# Patient Record
Sex: MALE | Race: WHITE | NOT HISPANIC OR LATINO | Employment: OTHER | URBAN - METROPOLITAN AREA
[De-identification: names, ages, dates, MRNs, and addresses within clinical notes are randomized per-mention and may not be internally consistent; named-entity substitution may affect disease eponyms.]

---

## 2023-06-04 ENCOUNTER — OFFICE VISIT (OUTPATIENT)
Dept: URGENT CARE | Age: 58
End: 2023-06-04
Payer: COMMERCIAL

## 2023-06-04 VITALS
TEMPERATURE: 98.4 F | HEART RATE: 76 BPM | WEIGHT: 215 LBS | RESPIRATION RATE: 18 BRPM | HEIGHT: 68 IN | BODY MASS INDEX: 32.58 KG/M2 | OXYGEN SATURATION: 100 % | DIASTOLIC BLOOD PRESSURE: 72 MMHG | SYSTOLIC BLOOD PRESSURE: 132 MMHG

## 2023-06-04 DIAGNOSIS — T14.8XXA SPLINTER: Primary | ICD-10-CM

## 2023-06-04 PROCEDURE — 99213 OFFICE O/P EST LOW 20 MIN: CPT

## 2023-06-04 PROCEDURE — 26080 EXPLORE/TREAT FINGER JOINT: CPT

## 2023-06-04 RX ORDER — CEPHALEXIN 500 MG/1
500 CAPSULE ORAL EVERY 12 HOURS SCHEDULED
Qty: 14 CAPSULE | Refills: 0 | Status: SHIPPED | OUTPATIENT
Start: 2023-06-04 | End: 2023-06-11

## 2023-06-04 NOTE — PROGRESS NOTES
[facility]        NAME: Dashawn Wray is a 62 y o  male  : 1965    MRN: [id number]  DATE: 2023  TIME: 12:56 PM    Assessment and Plan   Splinter Yue Ocampo  8XXA]  1  Splinter  cephalexin (KEFLEX) 500 mg capsule        Patient presents for eval of splinter to left palm  Rbeed hand on wooden railing about an hour ago  No current bleeding  Splinter visualized- removed successfully  Patient Instructions       Follow up with PCP as needed    Chief Complaint     Chief Complaint   Patient presents with   • Hand Injury     Put hand on wood railing and got splinter in left palm of hand earlier today         History of Present Illness       Patient presents for eval of splinter to left palm  Rbeed hand on wooden railing about an hour ago  No current bleeding  Splinter visualized- removed successfully  Review of Systems   Review of Systems   Constitutional: Negative for fever  Skin: Positive for wound  Current Medications       Current Outpatient Medications:   •  cephalexin (KEFLEX) 500 mg capsule, Take 1 capsule (500 mg total) by mouth every 12 (twelve) hours for 7 days, Disp: 14 capsule, Rfl: 0    Current Allergies     Allergies as of 2023   • (Not on File)            The following portions of the patient's history were reviewed and updated as appropriate: allergies, current medications, past family history, past medical history, past social history, past surgical history and problem list      Past Medical History:   Diagnosis Date   • Diabetes mellitus ([address] )    • High cholesterol    • Hypertension        Past Surgical History:   Procedure Laterality Date   • BACK SURGERY     • KNEE ARTHROCENTESIS     • SHOULDER SURGERY Left    • SPINE SURGERY         History reviewed  No pertinent family history  Medications have been verified          Objective   /72 (BP Location: Right arm, Patient Position: Sitting)   Pulse 76   Temp 98 4 °F (36 9 °C) (Tympanic)   Resp 18 " Ht 5' 8\" (1 727 m)   Wt 97 5 kg (215 lb)   SpO2 100%   BMI 32 69 kg/m²   No LMP for male patient  Physical Exam     Physical Exam  Vitals reviewed  Constitutional:       Appearance: Normal appearance  Skin:     General: Skin is warm and dry  Findings: Wound present  Neurological:      General: No focal deficit present  Mental Status: He is alert and oriented to person, place, and time  Mental status is at baseline  Universal Protocol:  Consent: Verbal consent obtained  Consent given by: patient  Patient understanding: patient states understanding of the procedure being performed  Patient identity confirmed: verbally with patient    Foreign body removal    Date/Time: 6/4/2023 12:00 PM    Performed by: ESA Husain  Authorized by: ESA Husain  Body area: skin  General location: upper extremity  Location details: left hand  Localization method: visualized  Removal mechanism: forceps  Dressing: dressing applied  Tendon involvement: none  Depth: subcutaneous  Complexity: simple  1 objects recovered    Objects recovered: splinter  Post-procedure assessment: foreign body removed  Patient tolerance: patient tolerated the procedure well with no immediate complications            "

## 2024-08-28 ENCOUNTER — OFFICE VISIT (OUTPATIENT)
Dept: URGENT CARE | Age: 59
End: 2024-08-28
Payer: COMMERCIAL

## 2024-08-28 VITALS
HEART RATE: 59 BPM | BODY MASS INDEX: 34.06 KG/M2 | DIASTOLIC BLOOD PRESSURE: 74 MMHG | TEMPERATURE: 97.2 F | RESPIRATION RATE: 16 BRPM | WEIGHT: 217 LBS | HEIGHT: 67 IN | SYSTOLIC BLOOD PRESSURE: 131 MMHG | OXYGEN SATURATION: 98 %

## 2024-08-28 DIAGNOSIS — T14.8XXA SPLINTER: Primary | ICD-10-CM

## 2024-08-28 PROCEDURE — 28190 REMOVAL OF FOOT FOREIGN BODY: CPT | Performed by: EMERGENCY MEDICINE

## 2024-08-28 PROCEDURE — 10120 INC&RMVL FB SUBQ TISS SMPL: CPT

## 2024-08-28 PROCEDURE — 99213 OFFICE O/P EST LOW 20 MIN: CPT | Performed by: EMERGENCY MEDICINE

## 2024-08-28 NOTE — PROGRESS NOTES
[facility]        NAME: Ryan Hutchins is a 59 y.o. male  : 1965    MRN: [id number]  DATE: 2024  TIME: 2:44 PM    Assessment and Plan   Splinter [T14.8XXA]  1. Splinter          Stepped on a piece of wood on his deck yesterday- has splinter. Attempted to removed. Removed successfully with splinter forceps. TDAP UTD. No s/s infection    Patient Instructions       Follow up with PCP as needed    If tests have been performed at [facility], our office will contact you with results if changes need to be made to the care plan discussed with you at the visit.  [facility]    Chief Complaint     Chief Complaint   Patient presents with    Foreign Body in Skin     Happened last night. Walking on wood deck dn got a splinter. Pt said its very tiny but in deep. Left foot.          History of Present Illness       Stepped on a piece of wood on his deck yesterday- has splinter. Attempted to removed. Removed successfully with splinter forceps. TDAP UTD. No s/s infection    Foreign Body in Skin        Review of Systems   Review of Systems   Skin:         Splinter in left foot   All other systems reviewed and are negative.        Current Medications     No current outpatient medications on file.    Current Allergies     Allergies as of 2024 - never reviewed   Allergen Reaction Noted    Lisinopril Cough 2002            The following portions of the patient's history were reviewed and updated as appropriate: allergies, current medications, past family history, past medical history, past social history, past surgical history and problem list.     Past Medical History:   Diagnosis Date    Diabetes mellitus (HCC)     High cholesterol     Hypertension        Past Surgical History:   Procedure Laterality Date    BACK SURGERY      KNEE ARTHROCENTESIS      SHOULDER SURGERY Left     SPINE SURGERY         History reviewed. No pertinent family  "history.      Medications have been verified.        Objective   /74   Pulse 59   Temp (!) 97.2 °F (36.2 °C)   Resp 16   Ht 5' 7\" (1.702 m)   Wt 98.4 kg (217 lb)   SpO2 98%   BMI 33.99 kg/m²   No LMP for male patient.       Physical Exam     Physical Exam  Vitals reviewed.   Constitutional:       Appearance: Normal appearance.   Musculoskeletal:        Feet:    Feet:      Comments: splinter  Neurological:      Mental Status: He is alert.       Universal Protocol:  Risks and benefits: risks, benefits and alternatives were discussed  Consent given by: patient  Patient identity confirmed: verbally with patient  Foreign body removal    Date/Time: 8/28/2024 12:30 PM    Performed by: ESA Carmichael  Authorized by: ESA Carmichael  Body area: skin  General location: lower extremity  Location details: left foot  1 objects recovered.  Objects recovered: splinter  Post-procedure assessment: foreign body removed  Patient tolerance: patient tolerated the procedure well with no immediate complications                  "

## 2024-10-24 ENCOUNTER — CONSULT (OUTPATIENT)
Dept: GASTROENTEROLOGY | Facility: MEDICAL CENTER | Age: 59
End: 2024-10-24
Payer: COMMERCIAL

## 2024-10-24 ENCOUNTER — TELEPHONE (OUTPATIENT)
Dept: GASTROENTEROLOGY | Facility: MEDICAL CENTER | Age: 59
End: 2024-10-24

## 2024-10-24 VITALS
HEART RATE: 66 BPM | OXYGEN SATURATION: 99 % | SYSTOLIC BLOOD PRESSURE: 129 MMHG | WEIGHT: 213.1 LBS | DIASTOLIC BLOOD PRESSURE: 75 MMHG | TEMPERATURE: 98.4 F | HEIGHT: 67 IN | BODY MASS INDEX: 33.45 KG/M2

## 2024-10-24 DIAGNOSIS — Z12.11 SCREENING FOR MALIGNANT NEOPLASM OF COLON: Primary | ICD-10-CM

## 2024-10-24 PROCEDURE — 99203 OFFICE O/P NEW LOW 30 MIN: CPT | Performed by: STUDENT IN AN ORGANIZED HEALTH CARE EDUCATION/TRAINING PROGRAM

## 2024-10-24 RX ORDER — LIDOCAINE 50 MG/G
OINTMENT TOPICAL
COMMUNITY
Start: 2024-02-28 | End: 2025-02-28

## 2024-10-24 RX ORDER — TAMSULOSIN HYDROCHLORIDE 0.4 MG/1
CAPSULE ORAL
COMMUNITY
Start: 2024-03-21 | End: 2025-02-28

## 2024-10-24 RX ORDER — METOPROLOL TARTRATE 25 MG/1
25 TABLET, FILM COATED ORAL 2 TIMES DAILY
COMMUNITY

## 2024-10-24 RX ORDER — BISACODYL 5 MG/1
20 TABLET, DELAYED RELEASE ORAL SEE ADMIN INSTRUCTIONS
Qty: 4 TABLET | Refills: 0 | Status: SHIPPED | OUTPATIENT
Start: 2024-10-24

## 2024-10-24 RX ORDER — ALLOPURINOL 300 MG/1
1 TABLET ORAL DAILY
COMMUNITY
Start: 2024-05-09 | End: 2025-02-28

## 2024-10-24 RX ORDER — DICLOFENAC POTASSIUM 50 MG/1
50 TABLET, FILM COATED ORAL 3 TIMES DAILY
COMMUNITY

## 2024-10-24 RX ORDER — ALOGLIPTIN 25 MG/1
TABLET, FILM COATED ORAL
COMMUNITY
Start: 2024-02-15 | End: 2025-02-15

## 2024-10-24 RX ORDER — ETODOLAC 300 MG/1
CAPSULE ORAL
COMMUNITY
Start: 2024-06-17 | End: 2025-02-01

## 2024-10-24 RX ORDER — ROSUVASTATIN CALCIUM 20 MG/1
TABLET, COATED ORAL
COMMUNITY
Start: 2024-02-01 | End: 2025-02-01

## 2024-10-24 RX ORDER — TRAMADOL HYDROCHLORIDE 50 MG/1
1 TABLET ORAL 2 TIMES DAILY PRN
COMMUNITY
Start: 2024-10-10

## 2024-10-24 RX ORDER — POLYETHYLENE GLYCOL 3350, SODIUM SULFATE ANHYDROUS, SODIUM BICARBONATE, SODIUM CHLORIDE, POTASSIUM CHLORIDE 236; 22.74; 6.74; 5.86; 2.97 G/4L; G/4L; G/4L; G/4L; G/4L
4000 POWDER, FOR SOLUTION ORAL ONCE
Qty: 4000 ML | Refills: 0 | Status: SHIPPED | OUTPATIENT
Start: 2024-10-24 | End: 2024-10-24

## 2024-10-24 RX ORDER — MELOXICAM 15 MG/1
TABLET ORAL
COMMUNITY
Start: 2024-03-07 | End: 2025-03-08

## 2024-10-24 NOTE — TELEPHONE ENCOUNTER
Procedure: Colonoscopy  Date: 12/04/2024  Physician performing: Dr. Fofana  Location of procedure:  [place]  Instructions given to patient: Golyt[place]   Diabetic: N/A  Clearances: N/A

## 2024-10-24 NOTE — PROGRESS NOTES
"Ambulatory Visit  Name: Ryan Hutchins      : 1965      MRN: [id number]  Encounter Provider: Vahe Fofana MD  Encounter Date: 10/24/2024   Encounter department: [facility] [place]    Assessment & Plan  Screening for malignant neoplasm of colon  He is at average risk for CRC. He is due for colonoscopy, and risk and benefits were discussed.    - Colonoscopy, Golytely    Orders:    Colonoscopy; Future    polyethylene glycol (Golytely) 4000 mL solution; Take 4,000 mL by mouth once for 1 dose Take 4000 mL by mouth once for 1 dose. Use as directed    bisacodyl (DULCOLAX) 5 mg EC tablet; Take 4 tablets (20 mg total) by mouth see administration instructions For colonoscopy prep.      History of Present Illness     Ryan Hutchins is a 59 y.o. male w/ hx of hypertension, diabetes, liver creating hyperlipidemia only thing who presents for screening colonoscopy.    Patient denies abdominal pain, diarrhea, constipation, hematochezia, melena, or unintentional weight loss. No family history of colon cancer.     He had a remote colonoscopy which was reportedly normal.      Review of Systems        Objective     /75   Pulse 66   Temp 98.4 °F (36.9 °C) (Tympanic)   Ht 5' 7\" (1.702 m)   Wt 96.7 kg (213 lb 1.6 oz)   SpO2 99%   BMI 33.38 kg/m²     Physical Exam    General: No acute distress  Abdomen: Soft, non-tender, non-distended, normoactive bowel sounds  Neuro: Awake, alert, oriented x 3    "

## 2024-11-19 ENCOUNTER — TELEPHONE (OUTPATIENT)
Dept: GASTROENTEROLOGY | Facility: MEDICAL CENTER | Age: 59
End: 2024-11-19

## 2024-11-19 NOTE — TELEPHONE ENCOUNTER
Spoke with patient; reviewed instructions--reviewed GI lab will call with arrival time and to call with any questions.

## 2024-11-19 NOTE — TELEPHONE ENCOUNTER
Confirming Upcoming Procedure: Colonoscopy on December 4  Physician performing: Dr. Fofana  Location of procedure:  [facility]  Prep: Golytely  Diabetic: No

## 2024-11-20 ENCOUNTER — ANESTHESIA EVENT (OUTPATIENT)
Dept: ANESTHESIOLOGY | Facility: HOSPITAL | Age: 59
End: 2024-11-20

## 2024-11-20 ENCOUNTER — ANESTHESIA (OUTPATIENT)
Dept: ANESTHESIOLOGY | Facility: HOSPITAL | Age: 59
End: 2024-11-20

## 2024-12-02 ENCOUNTER — TELEPHONE (OUTPATIENT)
Dept: GASTROENTEROLOGY | Facility: MEDICAL CENTER | Age: 59
End: 2024-12-02

## 2024-12-03 ENCOUNTER — TELEPHONE (OUTPATIENT)
Dept: GASTROENTEROLOGY | Facility: MEDICAL CENTER | Age: 59
End: 2024-12-03

## 2024-12-03 RX ORDER — SODIUM CHLORIDE 9 MG/ML
125 INJECTION, SOLUTION INTRAVENOUS CONTINUOUS
Status: CANCELLED | OUTPATIENT
Start: 2024-12-03

## 2024-12-03 NOTE — TELEPHONE ENCOUNTER
Pt reports he is a type 2 diabetic and his colonoscopy is scheduled for 1 PM tomorrow. Pt has not began bowel prep. Pt was offered to transfer to the scheduling department to reschedule however he declines at this time. Pt checks his blood sugar every couple of days. Advised to check his BG today, tonight, and tomorrow. If signs/symptoms of hypoglycemia occur, pt may dilute honey with water and drink. Pt does not have sweets or beverages that contain sugar at home. He prefers to not go out to purchase apple juice or hard candy. Pt uses sitagliptin (Januvia) daily. Advised to hold this medication prior to procedure; pt may take his daily dose after tomorrow's procedure. All questions answered. Pt is agreeable to recommendations.

## 2024-12-03 NOTE — TELEPHONE ENCOUNTER
Pt calling for time, advised 0100PM arrival time. Reminded pt about need for  18yrs+, pt confirmed understanding. Answered questions about prep.

## 2024-12-04 ENCOUNTER — ANESTHESIA EVENT (OUTPATIENT)
Dept: GASTROENTEROLOGY | Facility: MEDICAL CENTER | Age: 59
End: 2024-12-04
Payer: COMMERCIAL

## 2024-12-04 ENCOUNTER — HOSPITAL ENCOUNTER (OUTPATIENT)
Dept: GASTROENTEROLOGY | Facility: MEDICAL CENTER | Age: 59
Setting detail: OUTPATIENT SURGERY
Discharge: HOME | End: 2024-12-04
Payer: COMMERCIAL

## 2024-12-04 ENCOUNTER — ANESTHESIA (OUTPATIENT)
Dept: GASTROENTEROLOGY | Facility: MEDICAL CENTER | Age: 59
End: 2024-12-04
Payer: COMMERCIAL

## 2024-12-04 VITALS
OXYGEN SATURATION: 97 % | BODY MASS INDEX: 33.43 KG/M2 | HEART RATE: 72 BPM | TEMPERATURE: 97.2 F | WEIGHT: 213 LBS | HEIGHT: 67 IN | SYSTOLIC BLOOD PRESSURE: 134 MMHG | RESPIRATION RATE: 18 BRPM | DIASTOLIC BLOOD PRESSURE: 78 MMHG

## 2024-12-04 DIAGNOSIS — Z12.11 SCREENING FOR MALIGNANT NEOPLASM OF COLON: ICD-10-CM

## 2024-12-04 LAB — GLUCOSE SERPL-MCNC: 71 MG/DL (ref 65–140)

## 2024-12-04 PROCEDURE — 82948 REAGENT STRIP/BLOOD GLUCOSE: CPT

## 2024-12-04 PROCEDURE — 88342 IMHCHEM/IMCYTCHM 1ST ANTB: CPT | Performed by: STUDENT IN AN ORGANIZED HEALTH CARE EDUCATION/TRAINING PROGRAM

## 2024-12-04 PROCEDURE — 88305 TISSUE EXAM BY PATHOLOGIST: CPT | Performed by: STUDENT IN AN ORGANIZED HEALTH CARE EDUCATION/TRAINING PROGRAM

## 2024-12-04 PROCEDURE — 88341 IMHCHEM/IMCYTCHM EA ADD ANTB: CPT | Performed by: STUDENT IN AN ORGANIZED HEALTH CARE EDUCATION/TRAINING PROGRAM

## 2024-12-04 PROCEDURE — 45385 COLONOSCOPY W/LESION REMOVAL: CPT | Performed by: STUDENT IN AN ORGANIZED HEALTH CARE EDUCATION/TRAINING PROGRAM

## 2024-12-04 RX ORDER — LIDOCAINE HYDROCHLORIDE 20 MG/ML
INJECTION, SOLUTION EPIDURAL; INFILTRATION; INTRACAUDAL; PERINEURAL AS NEEDED
Status: DISCONTINUED | OUTPATIENT
Start: 2024-12-04 | End: 2024-12-04

## 2024-12-04 RX ORDER — SIMETHICONE 40MG/0.6ML
SUSPENSION, DROPS(FINAL DOSAGE FORM)(ML) ORAL AS NEEDED
Status: COMPLETED | OUTPATIENT
Start: 2024-12-04 | End: 2024-12-04

## 2024-12-04 RX ORDER — SODIUM CHLORIDE 9 MG/ML
125 INJECTION, SOLUTION INTRAVENOUS CONTINUOUS
Status: DISCONTINUED | OUTPATIENT
Start: 2024-12-04 | End: 2024-12-08 | Stop reason: HOSPADM

## 2024-12-04 RX ORDER — PROPOFOL 10 MG/ML
INJECTION, EMULSION INTRAVENOUS AS NEEDED
Status: DISCONTINUED | OUTPATIENT
Start: 2024-12-04 | End: 2024-12-04

## 2024-12-04 RX ADMIN — SODIUM CHLORIDE: 0.9 INJECTION, SOLUTION INTRAVENOUS at 14:20

## 2024-12-04 RX ADMIN — PROPOFOL 20 MG: 10 INJECTION, EMULSION INTRAVENOUS at 14:42

## 2024-12-04 RX ADMIN — PROPOFOL 20 MG: 10 INJECTION, EMULSION INTRAVENOUS at 14:48

## 2024-12-04 RX ADMIN — SODIUM CHLORIDE 125 ML/HR: 0.9 INJECTION, SOLUTION INTRAVENOUS at 13:23

## 2024-12-04 RX ADMIN — PROPOFOL 80 MG: 10 INJECTION, EMULSION INTRAVENOUS at 14:25

## 2024-12-04 RX ADMIN — LIDOCAINE HYDROCHLORIDE 100 MG: 20 INJECTION, SOLUTION EPIDURAL; INFILTRATION; INTRACAUDAL at 14:25

## 2024-12-04 RX ADMIN — PROPOFOL 20 MG: 10 INJECTION, EMULSION INTRAVENOUS at 14:45

## 2024-12-04 RX ADMIN — PROPOFOL 120 MCG/KG/MIN: 10 INJECTION, EMULSION INTRAVENOUS at 14:27

## 2024-12-04 RX ADMIN — Medication 40 MG: at 14:30

## 2024-12-04 NOTE — H&P
History and Physical - [facility]  Ryan Hutchins 59 y.o. male MRN: [id number]          HPI: Ryan Hutchins is a 59 y.o. year old male who presents for screening colonoscopy.      REVIEW OF SYSTEMS: Per the HPI, and otherwise unremarkable.    Historical Information   Past Medical History:   Diagnosis Date    Diabetes mellitus (HCC)     High cholesterol     Hypertension      Past Surgical History:   Procedure Laterality Date    BACK SURGERY      KNEE ARTHROCENTESIS      SHOULDER SURGERY Left     SPINE SURGERY       Social History   Social History     Substance and Sexual Activity   Alcohol Use Not Currently     Social History     Substance and Sexual Activity   Drug Use Not on file     Social History     Tobacco Use   Smoking Status Former    Types: Cigarettes    Passive exposure: Past   Smokeless Tobacco Never     No family history on file.    Meds/Allergies       Current Outpatient Medications:     allopurinol (ZYLOPRIM) 300 mg tablet    Alogliptin Benzoate 25 MG TABS    bisacodyl (DULCOLAX) 5 mg EC tablet    diclofenac potassium (CATAFLAM) 50 mg tablet    Ergocalciferol 50 MCG (2000 UT) TABS    etodolac (LODINE) 300 MG capsule    lidocaine (XYLOCAINE) 5 % ointment    meloxicam (MOBIC) 15 mg tablet    metFORMIN (GLUCOPHAGE) 1000 MG tablet    metoprolol tartrate (LOPRESSOR) 25 mg tablet    polyethylene glycol (Golytely) 4000 mL solution    rosuvastatin (CRESTOR) 20 MG tablet    tamsulosin (FLOMAX) 0.4 mg    traMADol (ULTRAM) 50 mg tablet    Allergies   Allergen Reactions    Lisinopril Cough       Objective     There were no vitals taken for this visit.      PHYSICAL EXAM    GEN: NAD  CARDIO: RRR  PULM: CTA bilaterally  ABD: soft, non-tender, non-distended  EXT: no lower extremity edema  NEURO: AAOx3      ASSESSMENT/PLAN:  59 y.o. year old male here for colonoscopy; he is stable and optimized for his procedure.

## 2024-12-04 NOTE — ANESTHESIA POSTPROCEDURE EVALUATION
Post-Op Assessment Note    CV Status:  Stable  Pain Score: 0    Pain management: adequate       Mental Status:  Alert and awake   Hydration Status:  Euvolemic   PONV Controlled:  Controlled   Airway Patency:  Patent     Post Op Vitals Reviewed: Yes    No anethesia notable event occurred.    Staff: CRNA           Last Filed PACU Vitals:  Vitals Value Taken Time   Temp     Pulse 74    /62    Resp 16    SpO2 98 ra        Modified Jefferson:  Activity: 2 (12/4/2024  2:59 PM)  Respiration: 2 (12/4/2024  2:59 PM)  Circulation: 2 (12/4/2024  2:59 PM)  Consciousness: 2 (12/4/2024  2:59 PM)  Oxygen Saturation: 2 (12/4/2024  2:59 PM)  Modified Jefferson Score: 10 (12/4/2024  2:59 PM)

## 2024-12-04 NOTE — ANESTHESIA PROCEDURE NOTES
Anesthesia Notable Event    Date/Time: 12/4/2024 2:33 PM    Patient location during procedure: pre-op  Performed by: Ileana Murphy CRNA  Authorized by: Bennie Johnson MD    Pt called r/t appointment time of 1pm and being diabetic. Told by call center staff that he may have honey in water, juice, or hard candy with no regard for NPO timing.   Cases delayed 30 minutes to meet 2 hr npo  Endo  aware- may reach out to educate RN.

## 2024-12-04 NOTE — ANESTHESIA PREPROCEDURE EVALUATION
Procedure:  COLONOSCOPY    Relevant Problems   ANESTHESIA (within normal limits)      CARDIO (within normal limits)      ENDO (within normal limits)      GI/HEPATIC (within normal limits)      /RENAL (within normal limits)      GYN (within normal limits)      HEMATOLOGY (within normal limits)      MUSCULOSKELETAL (within normal limits)      NEURO/PSYCH (within normal limits)      PULMONARY (within normal limits)        Physical Exam    Airway    Mallampati score: II         Dental       Cardiovascular  Cardiovascular exam normal    Pulmonary  Pulmonary exam normal     Other Findings        Anesthesia Plan  ASA Score- 2     Anesthesia Type- IV sedation with anesthesia with ASA Monitors.         Additional Monitors:     Airway Plan:     Comment: H/O HTN, high cholesterol, DM type II.       Plan Factors-Exercise tolerance (METS): >4 METS.    Chart reviewed.    Patient summary reviewed.    Patient is not a current smoker. Patient not instructed to abstain from smoking on day of procedure. Patient did not smoke on day of surgery.            Induction-     Postoperative Plan-         Informed Consent- Anesthetic plan and risks discussed with patient.  I personally reviewed this patient with the CRNA. Discussed and agreed on the Anesthesia Plan with the CRNA..

## 2024-12-10 PROCEDURE — 88341 IMHCHEM/IMCYTCHM EA ADD ANTB: CPT | Performed by: STUDENT IN AN ORGANIZED HEALTH CARE EDUCATION/TRAINING PROGRAM

## 2024-12-10 PROCEDURE — 88305 TISSUE EXAM BY PATHOLOGIST: CPT | Performed by: STUDENT IN AN ORGANIZED HEALTH CARE EDUCATION/TRAINING PROGRAM

## 2024-12-10 PROCEDURE — 88342 IMHCHEM/IMCYTCHM 1ST ANTB: CPT | Performed by: STUDENT IN AN ORGANIZED HEALTH CARE EDUCATION/TRAINING PROGRAM

## 2025-01-08 ENCOUNTER — CONSULT (OUTPATIENT)
Dept: NEUROSURGERY | Facility: CLINIC | Age: 60
End: 2025-01-08
Payer: COMMERCIAL

## 2025-01-08 VITALS
HEIGHT: 67 IN | WEIGHT: 213 LBS | SYSTOLIC BLOOD PRESSURE: 132 MMHG | RESPIRATION RATE: 18 BRPM | OXYGEN SATURATION: 97 % | HEART RATE: 66 BPM | TEMPERATURE: 98.1 F | BODY MASS INDEX: 33.43 KG/M2 | DIASTOLIC BLOOD PRESSURE: 76 MMHG

## 2025-01-08 DIAGNOSIS — M47.812 SPONDYLOSIS OF CERVICAL REGION WITHOUT MYELOPATHY OR RADICULOPATHY: Primary | ICD-10-CM

## 2025-01-08 DIAGNOSIS — G56.03 BILATERAL CARPAL TUNNEL SYNDROME: ICD-10-CM

## 2025-01-08 DIAGNOSIS — M43.16 LUMBAR ADJACENT SEGMENT DISEASE WITH SPONDYLOLISTHESIS: ICD-10-CM

## 2025-01-08 DIAGNOSIS — M51.369 LUMBAR ADJACENT SEGMENT DISEASE WITH SPONDYLOLISTHESIS: ICD-10-CM

## 2025-01-08 PROCEDURE — 99203 OFFICE O/P NEW LOW 30 MIN: CPT | Performed by: NEUROLOGICAL SURGERY

## 2025-01-08 NOTE — PROGRESS NOTES
Name: Ryan Hutchins      : 1965      MRN: [id number]  Encounter Provider: Remy Najera MD  Encounter Date: 2025   Encounter department: [facility] [place]  :  Assessment & Plan  Spondylosis of cervical region without myelopathy or radiculopathy  Cervical spondylosis and stenosis without radiculopathy or myelopathy.  I explained that he may be at higher risk of an acute spinal cord injury and event of extreme range of motion of the cervical spine, though surgical prophylactic decompression is not necessarily recommended.  Would recommend he exhaust nonsurgical pain management strategies and he will discuss this further with his pain specialist.  He may benefit from facet injections or possibly epidural steroid injection.  He would undergo routine neurologic examination with his PCP every 6 months to 1 year.  Reviewed the signs and symptoms of progressive cervical radiculopathy and myelopathy and asked him to contact this office any concerns arise.  Lumbar adjacent segment disease with spondylolisthesis  Adjacent level anterolisthesis at L4-5 with bilateral foraminal stenosis.  Pain seems to be reasonably well-controlled with injections and nonsurgical pain management strategies at present.  Bilateral carpal tunnel syndrome  Numbness and tingling in both hands consistent with carpal tunnel syndrome.  Patient reports that he had an EMG nerve conduction study last year which confirms this diagnosis.  He will follow-up with his orthopedic surgeon to discuss carpal tunnel release.      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  He has had neck pain for many years which have responded in the past to injections.  He currently describes daily pain which can radiate into the back of his head.  It seems to increase with certain activities and at times can spasm and lock up.  He has restriction range of motion  of both shoulder secondary to rotator cuff injury as well.  He denies any pain or weakness radiating into his arms.  He does have some isolated numbness in a median distribution in both hands and in the past has had an EMG and nerve conduction study which demonstrated carpal tunnel syndrome.  He denies any difficulties with fine motor tasks and does notice some difficulties with balance, though he has a right knee replacement and has a right L5-S1 decompression and fusion many years ago as well.  He does receive lumbar epidural steroid injections which helped his back and leg pain.    He has not tried any recent physical therapy or injections for cervical spine.  His current pain medications are listed below.  He is too uncomfortable to try physical therapy but does follow with a chiropractor.  He presents today with an MRI of the cervical spine and of the lumbar spine.      Review of Systems   HENT:  Negative for trouble swallowing.    Musculoskeletal:  Positive for myalgias (nape of neck), neck pain (neck pain into back of head) and neck stiffness (lower rom).   Neurological:  Positive for dizziness, weakness (due to numbness, trouble with ), numbness (b/l hands and fingers) and headaches (back of the head).   Hematological:  Does not bruise/bleed easily.   All other systems reviewed and are negative.   I have personally reviewed the MA's review of systems and made changes as necessary.    Past Medical History   Past Medical History:   Diagnosis Date    Diabetes mellitus (HCC)     High cholesterol     Hypertension      Past Surgical History:   Procedure Laterality Date    BACK SURGERY      COLONOSCOPY      KNEE ARTHROCENTESIS      SHOULDER SURGERY Left     SPINE SURGERY       History reviewed. No pertinent family history.   reports that he has quit smoking. His smoking use included cigarettes. He has been exposed to tobacco smoke. He has never used smokeless tobacco. He reports that he does not currently use  alcohol. He reports that he does not use drugs.  Current Outpatient Medications on File Prior to Visit   Medication Sig Dispense Refill    allopurinol (ZYLOPRIM) 300 mg tablet Take 1 tablet by mouth daily      Alogliptin Benzoate 25 MG TABS TAKE ONE TABLET BY MOUTH DAILY FOR DIABETES      diclofenac potassium (CATAFLAM) 50 mg tablet Take 50 mg by mouth Three times a day      Ergocalciferol 50 MCG (2000 UT) TABS Take 50,000 Units by mouth      etodolac (LODINE) 300 MG capsule TAKE ONE CAPSULE BY MOUTH TWICE A DAY (TAKE WITH FOOD OR MILK)      lidocaine (XYLOCAINE) 5 % ointment APPLY SMALL AMOUNT TOPICALLY TWICE A DAY      meloxicam (MOBIC) 15 mg tablet TAKE ONE TABLET BY MOUTH DAILY FOR PAIN OR INFLAMMATION (TAKE WITH FOOD OR MILK)      metoprolol tartrate (LOPRESSOR) 25 mg tablet Take 25 mg by mouth 2 (two) times a day      rosuvastatin (CRESTOR) 20 MG tablet TAKE ONE-HALF TABLET BY MOUTH EVERY DAY AT 5 PM      traMADol (ULTRAM) 50 mg tablet Take 1 tablet by mouth 2 (two) times a day as needed      metFORMIN (GLUCOPHAGE) 1000 MG tablet TAKE ONE TABLET BY MOUTH TWICE A DAY WITH MEALS FOR DIABETES (Patient not taking: Reported on 1/8/2025)      tamsulosin (FLOMAX) 0.4 mg TAKE TWO CAPSULES BY MOUTH AT BEDTIME FOR ENLARGED PROSTATE (Patient not taking: Reported on 1/8/2025)       No current facility-administered medications on file prior to visit.     Allergies   Allergen Reactions    Lisinopril Cough      Current Outpatient Medications on File Prior to Visit   Medication Sig Dispense Refill    allopurinol (ZYLOPRIM) 300 mg tablet Take 1 tablet by mouth daily      Alogliptin Benzoate 25 MG TABS TAKE ONE TABLET BY MOUTH DAILY FOR DIABETES      diclofenac potassium (CATAFLAM) 50 mg tablet Take 50 mg by mouth Three times a day      Ergocalciferol 50 MCG (2000 UT) TABS Take 50,000 Units by mouth      etodolac (LODINE) 300 MG capsule TAKE ONE CAPSULE BY MOUTH TWICE A DAY (TAKE WITH FOOD OR MILK)      lidocaine (XYLOCAINE) 5  "% ointment APPLY SMALL AMOUNT TOPICALLY TWICE A DAY      meloxicam (MOBIC) 15 mg tablet TAKE ONE TABLET BY MOUTH DAILY FOR PAIN OR INFLAMMATION (TAKE WITH FOOD OR MILK)      metoprolol tartrate (LOPRESSOR) 25 mg tablet Take 25 mg by mouth 2 (two) times a day      rosuvastatin (CRESTOR) 20 MG tablet TAKE ONE-HALF TABLET BY MOUTH EVERY DAY AT 5 PM      traMADol (ULTRAM) 50 mg tablet Take 1 tablet by mouth 2 (two) times a day as needed      metFORMIN (GLUCOPHAGE) 1000 MG tablet TAKE ONE TABLET BY MOUTH TWICE A DAY WITH MEALS FOR DIABETES (Patient not taking: Reported on 1/8/2025)      tamsulosin (FLOMAX) 0.4 mg TAKE TWO CAPSULES BY MOUTH AT BEDTIME FOR ENLARGED PROSTATE (Patient not taking: Reported on 1/8/2025)       No current facility-administered medications on file prior to visit.      Social History     Tobacco Use    Smoking status: Former     Types: Cigarettes     Passive exposure: Past    Smokeless tobacco: Never   Vaping Use    Vaping status: Former   Substance and Sexual Activity    Alcohol use: Not Currently    Drug use: Never    Sexual activity: Not on file        Objective   /76 (BP Location: Left arm, Patient Position: Sitting, Cuff Size: Standard)   Pulse 66   Temp 98.1 °F (36.7 °C) (Temporal)   Resp 18   Ht 5' 7\" (1.702 m)   Wt 96.6 kg (213 lb)   SpO2 97%   BMI 33.36 kg/m²     Physical Exam  Vitals reviewed.   Constitutional:       General: He is not in acute distress.  Eyes:      Extraocular Movements: Extraocular movements intact.   Pulmonary:      Effort: Pulmonary effort is normal. No respiratory distress.   Skin:     General: Skin is warm and dry.   Neurological:      Mental Status: He is alert and oriented to person, place, and time.      Sensory: Sensory deficit present.      Motor: No weakness.      Gait: Gait abnormal.      Comments: 5/5 power in upper and lower extremities.  Jeremiah negative bilaterally.  No dysdiadochokinesia.  No clonus.  Walks with a somewhat antalgic gait " favoring the right leg.  Diminished light touch and pinprick sensation in the median distribution bilaterally.   Psychiatric:         Mood and Affect: Mood normal.         Behavior: Behavior normal.       Neurological Exam  Mental Status  Alert. Oriented to person, place, and time.    Cranial Nerves  CN III, IV, VI: Extraocular movements intact bilaterally.    Gait   Abnormal gait.  5/5 power in upper and lower extremities.  Jeremiah negative bilaterally.  No dysdiadochokinesia.  No clonus.  Walks with a somewhat antalgic gait favoring the right leg.  Diminished light touch and pinprick sensation in the median distribution bilaterally..      Radiology Results Review: I personally reviewed the following image studies in PACS and associated radiology reports: MRI spine. My interpretation of the radiology images/reports is: MRI of the lumbar spine without contrast dated November 13, 2024.  No contrast administered.  Previous L5-S1 decompression and fusion.  Interbody graft does extend partially into the left foramen.  Grade 1 degenerative anterolisthesis at L4-5 with bilateral foraminal stenosis.  Note is made of a small central cystic structure likely synovial cyst from the left facet joint at L2-3 which is noncompressive..    MRI of the cervical spine without contrast dated November 13, 2024.  Straightening of normal cervical lordosis.  Advanced degenerative changes throughout the cervical spine.  Diffuse anterior osteophytes.  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  Mild stenosis at C3-4.  At C4-5 and C5-6 there is severe central canal stenosis left greater than right with left greater than right foraminal stenosis.  C6-7 there is moderate to severe central canal stenosis and right greater than left foraminal stenosis.  No other areas of significant neural compression.  Spinal cord is normal in signal.  Visualized posterior fossa structures are unremarkable.    Administrative Statements   I have spent a total time of 30 minutes in caring  for this patient on the day of the visit/encounter including Diagnostic results, Risks and benefits of tx options, Patient and family education, Risk factor reductions, Impressions, Counseling / Coordination of care, and Documenting in the medical record.

## 2025-02-05 ENCOUNTER — RESULTS FOLLOW-UP (OUTPATIENT)
Dept: GASTROENTEROLOGY | Facility: MEDICAL CENTER | Age: 60
End: 2025-02-05

## 2025-02-05 NOTE — TELEPHONE ENCOUNTER
I called patient to discuss pathology from recent colonoscopy.    The large pedunculated descending colon polyp ended up containing a 6 mm focus of leiomyoma (margins negative, confirmed by the pathologist).  A diminutive leiomyoma completely excised is benign and not concerning for cancer.    Otherwise, there were 2 small TA's and 1 small HP.    Next colonoscopy can be extended to 7 years (instead of 3 years which I originally recommended)

## 2025-07-02 ENCOUNTER — OFFICE VISIT (OUTPATIENT)
Dept: URGENT CARE | Age: 60
End: 2025-07-02
Payer: COMMERCIAL

## 2025-07-02 VITALS
OXYGEN SATURATION: 98 % | SYSTOLIC BLOOD PRESSURE: 132 MMHG | HEART RATE: 60 BPM | TEMPERATURE: 98.2 F | RESPIRATION RATE: 18 BRPM | DIASTOLIC BLOOD PRESSURE: 78 MMHG

## 2025-07-02 DIAGNOSIS — J32.9 SINOBRONCHITIS: Primary | ICD-10-CM

## 2025-07-02 DIAGNOSIS — J40 SINOBRONCHITIS: Primary | ICD-10-CM

## 2025-07-02 DIAGNOSIS — J02.9 SORE THROAT: ICD-10-CM

## 2025-07-02 LAB — S PYO AG THROAT QL: NEGATIVE

## 2025-07-02 PROCEDURE — 87880 STREP A ASSAY W/OPTIC: CPT | Performed by: STUDENT IN AN ORGANIZED HEALTH CARE EDUCATION/TRAINING PROGRAM

## 2025-07-02 PROCEDURE — 99213 OFFICE O/P EST LOW 20 MIN: CPT | Performed by: STUDENT IN AN ORGANIZED HEALTH CARE EDUCATION/TRAINING PROGRAM

## 2025-07-02 RX ORDER — AZITHROMYCIN 250 MG/1
TABLET, FILM COATED ORAL
Qty: 6 TABLET | Refills: 0 | Status: SHIPPED | OUTPATIENT
Start: 2025-07-02 | End: 2025-07-06

## 2025-07-02 NOTE — PROGRESS NOTES
[facility]  Name: Ryan Hutchins      : 1965      MRN: [id number]  Encounter Provider: Nahid Burt DO  Encounter Date: 2025   Encounter department: [facility] [place]  :  Assessment & Plan  Sore throat    Orders:    POCT rapid ANTIGEN strepA    Sinobronchitis    Orders:    azithromycin (ZITHROMAX) 250 mg tablet; Take 2 tablets today then 1 tablet daily x 4 days        Patient Instructions  Follow up with PCP in 3-5 days.  Proceed to  ER if symptoms worsen.    If tests are performed, our office will contact you with results only if changes need to made to the care plan discussed with you at the visit. [facility]    Chief Complaint:   Chief Complaint   Patient presents with    Sinusitis     Onset  States non productive cough, states sometimes he has a hard time breathing, runny nose, right ear pain, sore throat, congestion, states there is also a rash on right hand, states there is red spot that hurts when touching      History of Present Illness   Sinusitis  This is a new problem. The current episode started in the past 7 days. The problem has been gradually worsening since onset. There has been no fever. Associated symptoms include congestion, coughing, ear pain, sinus pressure and a sore throat. Pertinent negatives include no chills, diaphoresis, headaches, hoarse voice, neck pain, shortness of breath, sneezing or swollen glands. Past treatments include oral decongestants. The treatment provided mild relief.         Review of Systems   Constitutional:  Negative for chills and diaphoresis.   HENT:  Positive for congestion, ear pain, sinus pressure and sore throat. Negative for hoarse voice and sneezing.    Respiratory:  Positive for cough. Negative for shortness of breath.    Musculoskeletal:  Negative for neck pain.   Neurological:  Negative for headaches.     Past Medical History   Past Medical History[1]  Past Surgical  History[2]  Family History[3]  he reports that he has quit smoking. His smoking use included cigarettes. He has been exposed to tobacco smoke. He has never used smokeless tobacco. He reports that he does not currently use alcohol. He reports that he does not use drugs.  Current Outpatient Medications   Medication Instructions    allopurinol (ZYLOPRIM) 300 mg tablet 1 tablet, Daily    Alogliptin Benzoate 25 MG TABS TAKE ONE TABLET BY MOUTH DAILY FOR DIABETES    azithromycin (ZITHROMAX) 250 mg tablet Take 2 tablets today then 1 tablet daily x 4 days    diclofenac potassium (CATAFLAM) 50 mg, 3 times daily    Ergocalciferol 50,000 Units    etodolac (LODINE) 300 MG capsule TAKE ONE CAPSULE BY MOUTH TWICE A DAY (TAKE WITH FOOD OR MILK)    lidocaine (XYLOCAINE) 5 % ointment APPLY SMALL AMOUNT TOPICALLY TWICE A DAY    meloxicam (MOBIC) 15 mg tablet TAKE ONE TABLET BY MOUTH DAILY FOR PAIN OR INFLAMMATION (TAKE WITH FOOD OR MILK)    metFORMIN (GLUCOPHAGE) 1000 MG tablet TAKE ONE TABLET BY MOUTH TWICE A DAY WITH MEALS FOR DIABETES    metoprolol tartrate (LOPRESSOR) 25 mg, 2 times daily    rosuvastatin (CRESTOR) 20 MG tablet TAKE ONE-HALF TABLET BY MOUTH EVERY DAY AT 5 PM    tamsulosin (FLOMAX) 0.4 mg TAKE TWO CAPSULES BY MOUTH AT BEDTIME FOR ENLARGED PROSTATE    traMADol (ULTRAM) 50 mg tablet 1 tablet, 2 times daily PRN   Allergies[4]     Objective   /78   Pulse 60   Temp 98.2 °F (36.8 °C)   Resp 18   SpO2 98%      Physical Exam  Vitals and nursing note reviewed.   Constitutional:       General: He is not in acute distress.     Appearance: He is well-developed.   HENT:      Head: Normocephalic and atraumatic.      Right Ear: Tympanic membrane, ear canal and external ear normal.      Left Ear: Tympanic membrane, ear canal and external ear normal.      Nose: Congestion present.      Mouth/Throat:      Pharynx: Posterior oropharyngeal erythema present.     Eyes:      Conjunctiva/sclera: Conjunctivae normal.  "      Cardiovascular:      Rate and Rhythm: Normal rate and regular rhythm.      Heart sounds: No murmur heard.  Pulmonary:      Effort: Pulmonary effort is normal. No respiratory distress.      Breath sounds: Rhonchi present.   Abdominal:      Palpations: Abdomen is soft.      Tenderness: There is no abdominal tenderness.     Musculoskeletal:      Cervical back: Neck supple.     Skin:     General: Skin is warm and dry.      Capillary Refill: Capillary refill takes less than 2 seconds.     Neurological:      Mental Status: He is alert.     Psychiatric:         Mood and Affect: Mood normal.         Portions of the record may have been created with voice recognition software.  Occasional wrong word or \"sound a like\" substitutions may have occurred due to the inherent limitations of voice recognition software.  Read the chart carefully and recognize, using context, where substitutions have occurred.       [1]   Past Medical History:  Diagnosis Date    Diabetes mellitus (HCC)     High cholesterol     Hypertension    [2]   Past Surgical History:  Procedure Laterality Date    BACK SURGERY      COLONOSCOPY      KNEE ARTHROCENTESIS      SHOULDER SURGERY Left     SPINE SURGERY     [3] No family history on file.  [4]   Allergies  Allergen Reactions    Lisinopril Cough     "